# Patient Record
Sex: MALE | Race: BLACK OR AFRICAN AMERICAN | NOT HISPANIC OR LATINO | Employment: FULL TIME | URBAN - METROPOLITAN AREA
[De-identification: names, ages, dates, MRNs, and addresses within clinical notes are randomized per-mention and may not be internally consistent; named-entity substitution may affect disease eponyms.]

---

## 2022-01-20 ENCOUNTER — IMMUNIZATION (OUTPATIENT)
Dept: PRIMARY CARE CLINIC | Facility: CLINIC | Age: 52
End: 2022-01-20

## 2022-01-20 DIAGNOSIS — Z23 NEED FOR VACCINATION: Primary | ICD-10-CM

## 2022-01-20 PROCEDURE — 0001A COVID-19, MRNA, LNP-S, PF, 30 MCG/0.3 ML DOSE VACCINE: CPT | Mod: CV19,PBBFAC | Performed by: INTERNAL MEDICINE

## 2022-01-20 PROCEDURE — 91300 COVID-19, MRNA, LNP-S, PF, 30 MCG/0.3 ML DOSE VACCINE: CPT | Mod: PBBFAC | Performed by: INTERNAL MEDICINE

## 2022-02-10 ENCOUNTER — IMMUNIZATION (OUTPATIENT)
Dept: PRIMARY CARE CLINIC | Facility: CLINIC | Age: 52
End: 2022-02-10

## 2022-02-10 DIAGNOSIS — Z23 NEED FOR VACCINATION: Primary | ICD-10-CM

## 2022-02-10 PROCEDURE — 91305 COVID-19, MRNA, LNP-S, PF, 30 MCG/0.3 ML DOSE VACCINE (PFIZER): CPT | Mod: PBBFAC | Performed by: INTERNAL MEDICINE

## 2024-07-16 ENCOUNTER — HOSPITAL ENCOUNTER (EMERGENCY)
Facility: HOSPITAL | Age: 54
Discharge: HOME | End: 2024-07-16
Attending: EMERGENCY MEDICINE
Payer: COMMERCIAL

## 2024-07-16 VITALS
DIASTOLIC BLOOD PRESSURE: 62 MMHG | OXYGEN SATURATION: 97 % | WEIGHT: 160 LBS | HEIGHT: 63 IN | RESPIRATION RATE: 17 BRPM | SYSTOLIC BLOOD PRESSURE: 127 MMHG | TEMPERATURE: 98 F | BODY MASS INDEX: 28.35 KG/M2 | HEART RATE: 91 BPM

## 2024-07-16 DIAGNOSIS — M54.32 SCIATICA OF LEFT SIDE: ICD-10-CM

## 2024-07-16 DIAGNOSIS — M25.552 HIP PAIN, LEFT: Primary | ICD-10-CM

## 2024-07-16 PROCEDURE — 99284 EMERGENCY DEPT VISIT MOD MDM: CPT | Mod: 25

## 2024-07-16 PROCEDURE — 63600175 PHARM REV CODE 636 W HCPCS

## 2024-07-16 PROCEDURE — 96372 THER/PROPH/DIAG INJ SC/IM: CPT

## 2024-07-16 RX ORDER — CYCLOBENZAPRINE HCL 5 MG
5 TABLET ORAL 3 TIMES DAILY PRN
Qty: 20 TABLET | Refills: 0 | Status: SHIPPED | OUTPATIENT
Start: 2024-07-16 | End: 2024-07-26

## 2024-07-16 RX ORDER — LIDOCAINE 50 MG/G
1 PATCH TOPICAL ONCE
Qty: 15 PATCH | Refills: 0 | Status: SHIPPED | OUTPATIENT
Start: 2024-07-16 | End: 2024-07-16

## 2024-07-16 RX ORDER — DEXAMETHASONE SODIUM PHOSPHATE 4 MG/ML
10 INJECTION, SOLUTION INTRA-ARTICULAR; INTRALESIONAL; INTRAMUSCULAR; INTRAVENOUS; SOFT TISSUE
Status: COMPLETED | OUTPATIENT
Start: 2024-07-16 | End: 2024-07-16

## 2024-07-16 RX ORDER — KETOROLAC TROMETHAMINE 30 MG/ML
15 INJECTION, SOLUTION INTRAMUSCULAR; INTRAVENOUS
Status: COMPLETED | OUTPATIENT
Start: 2024-07-16 | End: 2024-07-16

## 2024-07-16 RX ORDER — NAPROXEN 500 MG/1
500 TABLET ORAL 2 TIMES DAILY
Qty: 20 TABLET | Refills: 0 | Status: SHIPPED | OUTPATIENT
Start: 2024-07-16

## 2024-07-16 RX ADMIN — DEXAMETHASONE SODIUM PHOSPHATE 10 MG: 4 INJECTION INTRA-ARTICULAR; INTRALESIONAL; INTRAMUSCULAR; INTRAVENOUS; SOFT TISSUE at 01:07

## 2024-07-16 RX ADMIN — KETOROLAC TROMETHAMINE 15 MG: 30 INJECTION, SOLUTION INTRAMUSCULAR at 01:07

## 2024-07-16 NOTE — ED PROVIDER NOTES
Encounter Date: 7/16/2024       History     Chief Complaint   Patient presents with    Leg Pain     LEFT that starts at hip and radiates down leg since last night. Denies injury/trauma. Took tylenol without relief.      53-year-old male with no past medical history presents to ED for emergent evaluation of left hip pain that radiates down his left lower extremity intermittently for the past 2 years.  He denies any associated trauma, fall, head trauma, LOC. he attempted Tylenol with transient relief.  He denies any fever, chills, chest pain, shortness of breath, abdominal pain, nausea, vomiting, diarrhea, dysuria, hematuria, bladder/bowel incontinence, saddle anesthesia, testicular pain, testicular swelling, penile pain, penile swelling, penile discharge.  No other symptoms reported.    The history is provided by the patient. No  was used.     Review of patient's allergies indicates:  No Known Allergies  No past medical history on file.  No past surgical history on file.  No family history on file.     Review of Systems   Constitutional:  Negative for chills and fever.   HENT:  Negative for congestion, ear pain, rhinorrhea and sore throat.    Eyes:  Negative for redness.   Respiratory:  Negative for cough and shortness of breath.    Cardiovascular:  Negative for chest pain.   Gastrointestinal:  Negative for abdominal pain, diarrhea, nausea and vomiting.   Genitourinary:  Negative for decreased urine volume, difficulty urinating, dysuria, frequency, hematuria, penile discharge, penile pain, penile swelling, scrotal swelling, testicular pain and urgency.        (-) bladder/bowel incontinence  (-) saddle anesthesia   Musculoskeletal:  Positive for arthralgias and myalgias. Negative for back pain and neck pain.   Skin:  Negative for rash.   Neurological:  Negative for headaches.        (-) head trauma  (-) LOC   Psychiatric/Behavioral:  Negative for confusion.        Physical Exam     Initial Vitals  [07/16/24 1239]   BP Pulse Resp Temp SpO2   127/62 91 17 98.4 °F (36.9 °C) 97 %      MAP       --         Physical Exam    Nursing note and vitals reviewed.  Constitutional: He appears well-developed and well-nourished. He is not diaphoretic.  Non-toxic appearance. No distress.   HENT:   Head: Normocephalic and atraumatic.   Right Ear: Hearing, tympanic membrane, external ear and ear canal normal. Tympanic membrane is not perforated, not erythematous and not bulging.   Left Ear: Hearing, tympanic membrane, external ear and ear canal normal. Tympanic membrane is not perforated, not erythematous and not bulging.   Nose: Nose normal.   Mouth/Throat: Uvula is midline and oropharynx is clear and moist.   Eyes: Conjunctivae and EOM are normal.   Neck: Neck supple.   Normal range of motion.   Full passive range of motion without pain.     Cardiovascular:            Pulses:       Radial pulses are 2+ on the right side and 2+ on the left side.   Pulmonary/Chest: Effort normal and breath sounds normal. No respiratory distress. He has no decreased breath sounds.   Musculoskeletal:      Cervical back: Full passive range of motion without pain, normal range of motion and neck supple. No rigidity.      Comments: Full ROM of neck. No neck rigidity. No midline tenderness to cervical, thoracic, or lumbar spine.  No bony step-offs.  Full range of motion of bilateral upper and lower extremities.  Strength and sensation intact bilateral upper and lower extremities.   No erythema, edema, bruising, rash, or cellulitis patient's back or bilateral lower extremities.      Neurological: He is alert. No cranial nerve deficit.   Neuro intact.  Strength and sensation intact to bilateral upper and lower extremities.   Skin: Skin is warm and dry. No rash noted.         ED Course   Procedures  Labs Reviewed - No data to display       Imaging Results    None          Medications   ketorolac injection 15 mg (15 mg Intramuscular Given 7/16/24 1341)    dexAMETHasone injection 10 mg (10 mg Intramuscular Given 7/16/24 1340)     Medical Decision Making  This is a 53-year-old male with no past medical history presents to ED for emergent evaluation of left hip pain that radiates down his left lower extremity intermittently for the past 2 years.  He denies any associated trauma, fall, head trauma, LOC. he attempted Tylenol with transient relief.  He denies any fever, chills, chest pain, shortness of breath, abdominal pain, nausea, vomiting, diarrhea, dysuria, hematuria, bladder/bowel incontinence, saddle anesthesia, testicular pain, testicular swelling, penile pain, penile swelling, penile discharge.  No other symptoms reported.    On physical exam, patient is well-appearing and in no acute distress.  Nontoxic appearing.  Lungs are clear to auscultation bilaterally.  Abdomen is soft and nontender.  No guarding, rigidity, rebound.  2+ radial pulses bilaterally.  Posterior oropharynx is not erythematous.  No edema or exudate.  Uvula midline.  Bilateral tympanic membrane is normal.  No erythema, bulging, or perforations.  Neuro intact.  Strength and sensation intact bilateral upper and lower extremities. Full ROM of neck. No neck rigidity. No midline tenderness to cervical, thoracic, or lumbar spine.  No bony step-offs.  Full range of motion of bilateral upper and lower extremities.  Strength and sensation intact bilateral upper and lower extremities.   No erythema, edema, bruising, rash, or cellulitis patient's back, hips, or bilateral lower extremity.  Patient denies any trauma.  He is moving his extremities well.  I do not believe he needs any imaging at this time.  Decadron and Toradol ordered.  Will discharge patient on anti-inflammatories, muscle relaxers, and Lidoderm patches.  Urged prompt follow-up with PCP for further evaluation.    Strict return precautions given. I discussed with the patient/family the diagnosis, treatment plan, indications for return to the  emergency department, and for expected follow-up. The patient/family verbalized an understanding. The patient/family is asked if there are any questions or concerns. We discuss the case, until all issues are addressed to the patient/family's satisfaction. Patient/family understands and is agreeable to the plan. Patient is stable and ready for discharge.      Risk  Prescription drug management.                                      Clinical Impression:  Final diagnoses:  [M25.552] Hip pain, left (Primary)  [M54.32] Sciatica of left side          ED Disposition Condition    Discharge Stable          ED Prescriptions       Medication Sig Dispense Start Date End Date Auth. Provider    naproxen (NAPROSYN) 500 MG tablet Take 1 tablet (500 mg total) by mouth 2 (two) times daily. 20 tablet 7/16/2024 -- Salena Lemus PA-C    cyclobenzaprine (FLEXERIL) 5 MG tablet Take 1 tablet (5 mg total) by mouth 3 (three) times daily as needed for Muscle spasms. 20 tablet 7/16/2024 7/26/2024 Salena Lemus PA-C    LIDOcaine (LIDODERM) 5 % (Expires today) Place 1 patch onto the skin once. Remove & Discard patch within 12 hours or as directed by MD for 1 dose 15 patch 7/16/2024 7/16/2024 Salena Lemus PA-C          Follow-up Information       Follow up With Specialties Details Why Contact [facility] -  Schedule an appointment as soon as possible for a visit in 2 days for further evaluation [address] [place] [address]  [phone]      [facility] - Emergency Dept Emergency Medicine In 2 days If symptoms worsen [id number]  [facility] [place] [id number] [phone]             Salena Lemus PA-C  07/16/24 1628       Salena Lemus PA-C  07/16/24 1628